# Patient Record
Sex: FEMALE | Race: ASIAN
[De-identification: names, ages, dates, MRNs, and addresses within clinical notes are randomized per-mention and may not be internally consistent; named-entity substitution may affect disease eponyms.]

---

## 2017-08-08 ENCOUNTER — HOSPITAL ENCOUNTER (OUTPATIENT)
Dept: HOSPITAL 23 - CWCC | Age: 47
Discharge: HOME | End: 2017-08-08
Attending: NURSE PRACTITIONER
Payer: COMMERCIAL

## 2017-08-08 DIAGNOSIS — Z12.31: Primary | ICD-10-CM

## 2017-08-08 PROCEDURE — G0202 SCR MAMMO BI INCL CAD: HCPCS

## 2019-01-31 ENCOUNTER — HOSPITAL ENCOUNTER (EMERGENCY)
Facility: HOSPITAL | Age: 49
Discharge: HOME | End: 2019-02-01
Attending: EMERGENCY MEDICINE | Admitting: EMERGENCY MEDICINE

## 2019-01-31 ENCOUNTER — APPOINTMENT (OUTPATIENT)
Dept: GENERAL RADIOLOGY | Facility: HOSPITAL | Age: 49
End: 2019-01-31

## 2019-01-31 DIAGNOSIS — J02.0 STREPTOCOCCAL PHARYNGITIS: Primary | ICD-10-CM

## 2019-01-31 LAB
ALBUMIN SERPL-MCNC: 4.2 G/DL (ref 3.5–5.2)
ALBUMIN/GLOB SERPL: 1.4 G/DL
ALP SERPL-CCNC: 68 U/L (ref 39–117)
ALT SERPL W P-5'-P-CCNC: 19 U/L (ref 1–33)
ANION GAP SERPL CALCULATED.3IONS-SCNC: 13.5 MMOL/L
AST SERPL-CCNC: 19 U/L (ref 1–32)
BASOPHILS # BLD AUTO: 0.03 10*3/MM3 (ref 0–0.2)
BASOPHILS NFR BLD AUTO: 0.2 % (ref 0–1.5)
BILIRUB SERPL-MCNC: 0.6 MG/DL (ref 0.1–1.2)
BUN BLD-MCNC: 14 MG/DL (ref 6–20)
BUN/CREAT SERPL: 19.4 (ref 7–25)
CALCIUM SPEC-SCNC: 9.4 MG/DL (ref 8.6–10.5)
CHLORIDE SERPL-SCNC: 99 MMOL/L (ref 98–107)
CO2 SERPL-SCNC: 27.5 MMOL/L (ref 22–29)
CREAT BLD-MCNC: 0.72 MG/DL (ref 0.57–1)
DEPRECATED RDW RBC AUTO: 42.3 FL (ref 37–54)
EOSINOPHIL # BLD AUTO: 0.06 10*3/MM3 (ref 0–0.7)
EOSINOPHIL NFR BLD AUTO: 0.5 % (ref 0.3–6.2)
ERYTHROCYTE [DISTWIDTH] IN BLOOD BY AUTOMATED COUNT: 13.4 % (ref 11.7–13)
GFR SERPL CREATININE-BSD FRML MDRD: 105 ML/MIN/1.73
GFR SERPL CREATININE-BSD FRML MDRD: 87 ML/MIN/1.73
GLOBULIN UR ELPH-MCNC: 3 GM/DL
GLUCOSE BLD-MCNC: 114 MG/DL (ref 65–99)
HCT VFR BLD AUTO: 44.3 % (ref 35.6–45.5)
HGB BLD-MCNC: 15 G/DL (ref 11.9–15.5)
HOLD SPECIMEN: NORMAL
IMM GRANULOCYTES # BLD AUTO: 0.03 10*3/MM3 (ref 0–0.03)
IMM GRANULOCYTES NFR BLD AUTO: 0.2 % (ref 0–0.5)
LYMPHOCYTES # BLD AUTO: 1.21 10*3/MM3 (ref 0.9–4.8)
LYMPHOCYTES NFR BLD AUTO: 9.6 % (ref 19.6–45.3)
MCH RBC QN AUTO: 29.2 PG (ref 26.9–32)
MCHC RBC AUTO-ENTMCNC: 33.9 G/DL (ref 32.4–36.3)
MCV RBC AUTO: 86.2 FL (ref 80.5–98.2)
MONOCYTES # BLD AUTO: 0.77 10*3/MM3 (ref 0.2–1.2)
MONOCYTES NFR BLD AUTO: 6.1 % (ref 5–12)
NEUTROPHILS # BLD AUTO: 10.54 10*3/MM3 (ref 1.9–8.1)
NEUTROPHILS NFR BLD AUTO: 83.4 % (ref 42.7–76)
PLATELET # BLD AUTO: 243 10*3/MM3 (ref 140–500)
PMV BLD AUTO: 10.5 FL (ref 6–12)
POTASSIUM BLD-SCNC: 3.2 MMOL/L (ref 3.5–5.2)
PROT SERPL-MCNC: 7.2 G/DL (ref 6–8.5)
RBC # BLD AUTO: 5.14 10*6/MM3 (ref 3.9–5.2)
SODIUM BLD-SCNC: 140 MMOL/L (ref 136–145)
WBC NRBC COR # BLD: 12.64 10*3/MM3 (ref 4.5–10.7)
WHOLE BLOOD HOLD SPECIMEN: NORMAL
WHOLE BLOOD HOLD SPECIMEN: NORMAL

## 2019-01-31 PROCEDURE — 86308 HETEROPHILE ANTIBODY SCREEN: CPT | Performed by: EMERGENCY MEDICINE

## 2019-01-31 PROCEDURE — 96361 HYDRATE IV INFUSION ADD-ON: CPT

## 2019-01-31 PROCEDURE — 99284 EMERGENCY DEPT VISIT MOD MDM: CPT

## 2019-01-31 PROCEDURE — 85025 COMPLETE CBC W/AUTO DIFF WBC: CPT | Performed by: EMERGENCY MEDICINE

## 2019-01-31 PROCEDURE — 25010000002 CEFTRIAXONE PER 250 MG: Performed by: EMERGENCY MEDICINE

## 2019-01-31 PROCEDURE — 80053 COMPREHEN METABOLIC PANEL: CPT | Performed by: EMERGENCY MEDICINE

## 2019-01-31 PROCEDURE — 96365 THER/PROPH/DIAG IV INF INIT: CPT

## 2019-01-31 PROCEDURE — 71046 X-RAY EXAM CHEST 2 VIEWS: CPT

## 2019-01-31 RX ORDER — SODIUM CHLORIDE 0.9 % (FLUSH) 0.9 %
10 SYRINGE (ML) INJECTION AS NEEDED
Status: DISCONTINUED | OUTPATIENT
Start: 2019-01-31 | End: 2019-02-01 | Stop reason: HOSPADM

## 2019-01-31 RX ORDER — HYDROCHLOROTHIAZIDE 25 MG/1
25 TABLET ORAL DAILY
COMMUNITY

## 2019-01-31 RX ORDER — ACETAMINOPHEN 500 MG
1000 TABLET ORAL ONCE
Status: COMPLETED | OUTPATIENT
Start: 2019-01-31 | End: 2019-01-31

## 2019-01-31 RX ADMIN — SODIUM CHLORIDE 1000 ML: 9 INJECTION, SOLUTION INTRAVENOUS at 22:53

## 2019-01-31 RX ADMIN — CEFTRIAXONE 500 MG: 500 INJECTION, POWDER, FOR SOLUTION INTRAMUSCULAR; INTRAVENOUS at 23:04

## 2019-01-31 RX ADMIN — ACETAMINOPHEN 1000 MG: 500 TABLET, FILM COATED ORAL at 22:54

## 2019-02-01 VITALS
RESPIRATION RATE: 16 BRPM | BODY MASS INDEX: 25.19 KG/M2 | TEMPERATURE: 102.9 F | HEART RATE: 105 BPM | OXYGEN SATURATION: 95 % | WEIGHT: 136.9 LBS | DIASTOLIC BLOOD PRESSURE: 77 MMHG | HEIGHT: 62 IN | SYSTOLIC BLOOD PRESSURE: 118 MMHG

## 2019-02-01 LAB — HETEROPH AB SER QL LA: NEGATIVE

## 2019-02-01 RX ORDER — IBUPROFEN 200 MG
600 TABLET ORAL ONCE
Status: COMPLETED | OUTPATIENT
Start: 2019-02-01 | End: 2019-02-01

## 2019-02-01 RX ORDER — AMOXICILLIN 875 MG/1
875 TABLET, COATED ORAL 2 TIMES DAILY
Qty: 20 TABLET | Refills: 0 | Status: SHIPPED | OUTPATIENT
Start: 2019-02-01

## 2019-02-01 RX ADMIN — IBUPROFEN 600 MG: 200 TABLET, FILM COATED ORAL at 00:26

## 2019-02-01 NOTE — ED PROVIDER NOTES
" EMERGENCY DEPARTMENT ENCOUNTER    CHIEF COMPLAINT  Chief Complaint: fever  History given by: patient   History limited by: nothing  Room Number: 22/22  PMD: Provider, No Known      HPI:  Pt is a 48 y.o. female who presents to the ED complaining of fever that started this morning at 0800. Pt admits chills, headache, cough, and sore throat. Pt denies vomiting and diarrhea. Pt's daughter currently has strep throat. Pt reports she has taken 2 Advil's today to help w her symptoms.     Duration:  several hours   Onset: gradual  Timing: constant   Quality: \"cold like symptoms\"  Intensity/Severity: moderate  Progression: unchanged  Associated Symptoms: chills, headache, cough, and sore throat   Aggravating Factors: none mentioned  Alleviating Factors: none mentioned   Previous Episodes: none   Treatment before arrival: pt reports she has taken 2 Advil's today to help w her symptoms    PAST MEDICAL HISTORY  Active Ambulatory Problems     Diagnosis Date Noted   • No Active Ambulatory Problems     Resolved Ambulatory Problems     Diagnosis Date Noted   • No Resolved Ambulatory Problems     No Additional Past Medical History       PAST SURGICAL HISTORY  No past surgical history on file.    FAMILY HISTORY  No family history on file.    SOCIAL HISTORY  Social History     Socioeconomic History   • Marital status:      Spouse name: Not on file   • Number of children: Not on file   • Years of education: Not on file   • Highest education level: Not on file   Social Needs   • Financial resource strain: Not on file   • Food insecurity - worry: Not on file   • Food insecurity - inability: Not on file   • Transportation needs - medical: Not on file   • Transportation needs - non-medical: Not on file   Occupational History   • Not on file   Tobacco Use   • Smoking status: Not on file   Substance and Sexual Activity   • Alcohol use: Not on file   • Drug use: Not on file   • Sexual activity: Not on file   Other Topics Concern   • " Not on file   Social History Narrative   • Not on file       ALLERGIES  Patient has no known allergies.    REVIEW OF SYSTEMS  Review of Systems   Constitutional: Positive for chills and fever.   HENT: Positive for sore throat.    Eyes: Negative.    Respiratory: Positive for cough. Negative for shortness of breath.    Cardiovascular: Negative for chest pain.   Gastrointestinal: Negative for abdominal pain, diarrhea and vomiting.   Genitourinary: Negative for dysuria.   Musculoskeletal: Negative for neck pain.   Skin: Negative for rash.   Neurological: Positive for headaches. Negative for weakness and numbness.   Hematological: Negative.    Psychiatric/Behavioral: Negative.    All other systems reviewed and are negative.      PHYSICAL EXAM  ED Triage Vitals [01/31/19 2216]   Temp Heart Rate Resp BP SpO2   (!) 102.8 °F (39.3 °C) 113 16 -- 98 %      Temp src Heart Rate Source Patient Position BP Location FiO2 (%)   Tympanic -- -- -- --       Physical Exam   Constitutional: She is oriented to person, place, and time. No distress.   HENT:   Head: Normocephalic and atraumatic.   Mouth/Throat: Oropharyngeal exudate, posterior oropharyngeal edema and posterior oropharyngeal erythema present.   Eyes: EOM are normal. Pupils are equal, round, and reactive to light.   Neck: Normal range of motion. Neck supple.   Cardiovascular: Normal rate, regular rhythm and normal heart sounds.   Pulmonary/Chest: Effort normal and breath sounds normal. No respiratory distress.   Abdominal: Soft. There is no tenderness. There is no rebound and no guarding.   Musculoskeletal: Normal range of motion. She exhibits no edema.   Lymphadenopathy:     She has cervical adenopathy (tender).   Neurological: She is alert and oriented to person, place, and time. She has normal sensation and normal strength.   Skin: Skin is warm and dry. No rash noted.   Psychiatric: Mood and affect normal.   Nursing note and vitals reviewed.      LAB RESULTS  Lab Results  (last 24 hours)     Procedure Component Value Units Date/Time    CBC & Differential [[id number]] Collected:  01/31/19 2232    Specimen:  Blood Updated:  01/31/19 2301    Narrative:       The following orders were created for panel order CBC & Differential.  Procedure                               Abnormality         Status                     ---------                               -----------         ------                     CBC Auto Differential[[id number]]        Abnormal            Final result                 Please view results for these tests on the individual orders.    Comprehensive Metabolic Panel [[id number]]  (Abnormal) Collected:  01/31/19 2232    Specimen:  Blood Updated:  01/31/19 2304     Glucose 114 mg/dL      BUN 14 mg/dL      Creatinine 0.72 mg/dL      Sodium 140 mmol/L      Potassium 3.2 mmol/L      Chloride 99 mmol/L      CO2 27.5 mmol/L      Calcium 9.4 mg/dL      Total Protein 7.2 g/dL      Albumin 4.20 g/dL      ALT (SGPT) 19 U/L      AST (SGOT) 19 U/L      Alkaline Phosphatase 68 U/L      Total Bilirubin 0.6 mg/dL      eGFR Non African Amer 87 mL/min/1.73      eGFR  African Amer 105 mL/min/1.73      Globulin 3.0 gm/dL      A/G Ratio 1.4 g/dL      BUN/Creatinine Ratio 19.4     Anion Gap 13.5 mmol/L     Mononucleosis Screen [[id number]]  (Normal) Collected:  01/31/19 2232    Specimen:  Blood Updated:  02/01/19 0020     Monospot Negative    CBC Auto Differential [[id number]]  (Abnormal) Collected:  01/31/19 2232    Specimen:  Blood Updated:  01/31/19 2301     WBC 12.64 10*3/mm3      RBC 5.14 10*6/mm3      Hemoglobin 15.0 g/dL      Hematocrit 44.3 %      MCV 86.2 fL      MCH 29.2 pg      MCHC 33.9 g/dL      RDW 13.4 %      RDW-SD 42.3 fl      MPV 10.5 fL      Platelets 243 10*3/mm3      Neutrophil % 83.4 %      Lymphocyte % 9.6 %      Monocyte % 6.1 %      Eosinophil % 0.5 %      Basophil % 0.2 %      Immature Grans % 0.2 %      Neutrophils, Absolute 10.54 10*3/mm3      Lymphocytes, Absolute 1.21  10*3/mm3      Monocytes, Absolute 0.77 10*3/mm3      Eosinophils, Absolute 0.06 10*3/mm3      Basophils, Absolute 0.03 10*3/mm3      Immature Grans, Absolute 0.03 10*3/mm3           I ordered the above labs and reviewed the results    RADIOLOGY  XR Chest 2 View   Final Result   1. No active disease.       This report was finalized on 1/31/2019 10:55 PM by Tiburcio Oden M.D.               I ordered the above noted radiological studies. Interpreted by radiologist. Reviewed by me in PACS.       PROCEDURES  Procedures      PROGRESS AND CONSULTS       2228  Ordered CXR for further evaluation.     2238  Ordered tylenol for pt's fever.    0025  Rechecked pt. Pt is resting comfortably. Notified pt of lab results and imaging. Instructed the pt to follow up w Dr. Brooke tomorrow and to take tylenol every 4-6 hours to treat her fever. Will prescribe antibiotics to help treat pt's symptoms. Discussed the plan to discharge. Pt understands and agrees with the plan, all questions answered.      MEDICAL DECISION MAKING  Results were reviewed/discussed with the patient and they were also made aware of online access. Pt also made aware that some labs, such as cultures, will not be resulted during ER visit and follow up with PMD is necessary.     MDM  Number of Diagnoses or Management Options     Amount and/or Complexity of Data Reviewed  Clinical lab tests: ordered and reviewed (WBC= 12.64  Potassium= 3.2)  Tests in the radiology section of CPT®: ordered and reviewed (CXR= no active disease.)  Decide to obtain previous medical records or to obtain history from someone other than the patient: yes  Review and summarize past medical records: yes (Pt has no previous medical records.)           DIAGNOSIS  Final diagnoses:   Streptococcal pharyngitis       DISPOSITION  DISCHARGE    Patient discharged in stable condition.    Reviewed implications of results, diagnosis, meds, responsibility to follow up, warning signs and symptoms of  possible worsening, potential complications and reasons to return to ER.    Patient/Family voiced understanding of above instructions.    Discussed plan for discharge, as there is no emergent indication for admission. Patient referred to primary care provider for BP management due to today's BP. Pt/family is agreeable and understands need for follow up and repeat testing.  Pt is aware that discharge does not mean that nothing is wrong but it indicates no emergency is present that requires admission and they must continue care with follow-up as given below or physician of their choice.     FOLLOW-UP  Keep scheduled appointment with your Doctor tomorrow               Medication List      New Prescriptions    amoxicillin 875 MG tablet  Commonly known as:  AMOXIL  Take 1 tablet by mouth 2 (Two) Times a Day.              Latest Documented Vital Signs:  As of 12:31 AM  BP- 112/77 HR- 107 Temp- (!) 102.9 °F (39.4 °C) (Oral) O2 sat- 96%    --  Documentation assistance provided by geronimo Phipps for Dr. Horton. Information recorded by the scribe was done at my direction and has been verified and validated by me.             June Phipps  02/01/19 0032       Harrison Horton MD  02/01/19 0106

## 2019-02-01 NOTE — ED NOTES
Pt c/o fever, sore throat, cough since approx 0830 this am.  States her daughter was recently treated for strep.       Marylu Magana RN  01/31/19 5278